# Patient Record
Sex: MALE | Race: WHITE | URBAN - METROPOLITAN AREA
[De-identification: names, ages, dates, MRNs, and addresses within clinical notes are randomized per-mention and may not be internally consistent; named-entity substitution may affect disease eponyms.]

---
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
## 2019-08-06 ENCOUNTER — OFFICE VISIT (OUTPATIENT)
Dept: FAMILY MEDICINE CLINIC | Facility: CLINIC | Age: 19
End: 2019-08-06
Payer: COMMERCIAL

## 2019-08-06 VITALS
RESPIRATION RATE: 18 BRPM | BODY MASS INDEX: 23.02 KG/M2 | HEIGHT: 69.6 IN | WEIGHT: 159 LBS | HEART RATE: 58 BPM | SYSTOLIC BLOOD PRESSURE: 116 MMHG | TEMPERATURE: 98 F | DIASTOLIC BLOOD PRESSURE: 68 MMHG

## 2019-08-06 DIAGNOSIS — Z00.00 ENCOUNTER FOR HEALTH MAINTENANCE EXAMINATION IN ADULT: Primary | ICD-10-CM

## 2019-08-06 PROCEDURE — 99395 PREV VISIT EST AGE 18-39: CPT | Performed by: NURSE PRACTITIONER

## 2019-08-06 NOTE — PROGRESS NOTES
HPI:    Patient ID: Slade Singh is a 20year old male. HPI physical.  Dates will be studying abroad in [facility]. Patient states he has been doing well with his asthma–only uses the inhaler occasionally for exercise otherwise does not need it.   [place] Encounter for health maintenance examination in adult  (primary encounter diagnosis)    No orders of the defined types were placed in this encounter.       Meds This Visit:  Requested Prescriptions      No prescriptions requested or ordered in this encounte · Take medicine as prescribed to relieve your symptoms. Unless another medicine was prescribed, you can try over-the-counter motion sickness pills. Note that these medicines may cause drowsiness. · If symptoms are severe, rest quietly in bed.  Change posit

## 2019-08-06 NOTE — PATIENT INSTRUCTIONS
Recommend Hepatitis A -  Series of 2  Given 6 months apart    Gardasil 9 (HPV) vaccine-  series of 3 given 1, then in 2 months, then in 6 months.      Discussed that labyrinthitis is an infection of the inner ear- typically it is viral and just takes time t have no symptoms, you are at a higher risk of falling. Let someone help you when you get up. Get rid of home hazards such as loose electrical cords and throw rugs. Don’t walk in unfamiliar areas that are not lighted.  Use night lights in bathrooms and [facility]

## 2021-05-24 ENCOUNTER — OFFICE VISIT (OUTPATIENT)
Dept: FAMILY MEDICINE CLINIC | Facility: CLINIC | Age: 21
End: 2021-05-24
Payer: COMMERCIAL

## 2021-05-24 ENCOUNTER — TELEPHONE (OUTPATIENT)
Dept: FAMILY MEDICINE CLINIC | Facility: CLINIC | Age: 21
End: 2021-05-24

## 2021-05-24 VITALS
HEIGHT: 69.5 IN | WEIGHT: 160 LBS | BODY MASS INDEX: 23.16 KG/M2 | RESPIRATION RATE: 16 BRPM | DIASTOLIC BLOOD PRESSURE: 64 MMHG | SYSTOLIC BLOOD PRESSURE: 102 MMHG | TEMPERATURE: 99 F | HEART RATE: 88 BPM | OXYGEN SATURATION: 98 %

## 2021-05-24 DIAGNOSIS — L30.9 DERMATITIS: Primary | ICD-10-CM

## 2021-05-24 PROCEDURE — 3078F DIAST BP <80 MM HG: CPT | Performed by: NURSE PRACTITIONER

## 2021-05-24 PROCEDURE — 99213 OFFICE O/P EST LOW 20 MIN: CPT | Performed by: NURSE PRACTITIONER

## 2021-05-24 PROCEDURE — 3008F BODY MASS INDEX DOCD: CPT | Performed by: NURSE PRACTITIONER

## 2021-05-24 PROCEDURE — 3074F SYST BP LT 130 MM HG: CPT | Performed by: NURSE PRACTITIONER

## 2021-05-24 RX ORDER — PREDNISONE 10 MG/1
TABLET ORAL
Qty: 18 TABLET | Refills: 0 | Status: SHIPPED | OUTPATIENT
Start: 2021-05-24 | End: 2021-05-27

## 2021-05-24 NOTE — PATIENT INSTRUCTIONS
Tapering dose of prednisone. Take prednisone with food and not at bedtime. Return to clinic if rash not improving or if worsens.

## 2021-05-24 NOTE — TELEPHONE ENCOUNTER
Has appt tomorrow. Answered yes to negative covid test on travel screening. Pt has to get tested weekly for school. Denies any other covid symptoms.  Pt does have a rash on chest/ believes it could be from tick bite

## 2021-05-24 NOTE — PROGRESS NOTES
HPI:    Patient ID: Jeromy Silva is a 24year old male. HPI     Rash to upper chest for at least a week. Has been spreading. No itching.    Recently graduated from [employer] and is back home for a few weeks before he moves out to the [facility] a Musculoskeletal:         General: Normal range of motion. Skin:     General: Skin is warm and dry. Findings: Rash (Scattered erythematous rash especially across the trunk anteriorly and the upper arms.   There is some areas to the back especially o

## 2021-05-26 ENCOUNTER — TELEPHONE (OUTPATIENT)
Dept: FAMILY MEDICINE CLINIC | Facility: CLINIC | Age: 21
End: 2021-05-26

## 2021-05-26 NOTE — TELEPHONE ENCOUNTER
Spoke with Dad. He states that they thought that they had tested him for covid in ER. Rash is on chest, under arms, back, lips and mouth. Dad states that it is not on his face, hands or feet.      Dad will confirm if rash has spread since he saw him this

## 2021-05-26 NOTE — TELEPHONE ENCOUNTER
Dad confirmed that rapid covid in ER was negative. Rash has not spread anywhere new than what was stated before.

## 2021-05-26 NOTE — TELEPHONE ENCOUNTER
Patient's father Tyra Mendiola informed of the below recommendations. F/u appt scheduled.      Future Appointments   Date Time Provider Omar Ashli   5/27/2021 11:00 AM LINDSEY Grey [facility] [place]

## 2021-05-26 NOTE — TELEPHONE ENCOUNTER
Please let dad know that I looked in the care everywhere ER record and his Lyme's disease test came back negative. The rest of the lab work does show some inflammation. –Has he had a COVID-19 test?  I have not seen results for that anywhere.   Also please a

## 2021-05-26 NOTE — TELEPHONE ENCOUNTER
Would recommend continuing doxycycline for now–be cautious with the sun as it will increased risk for sunburn.   He can take Tylenol or ibuprofen for fever and mouth pain–he can try Biotene mouth rinse/Anbesol to help with sores–follow-up in the office margret

## 2021-05-26 NOTE — TELEPHONE ENCOUNTER
requests appt tomorrow for rash / mouth sores      went to ER Sunday night - poss Lymes dx    ( awaiting results )    wants to be re-evaluated       No future appointments.

## 2021-05-26 NOTE — TELEPHONE ENCOUNTER
Dad called with update in regards to Pierre's condition. He states that rash was worsening after appointment with [facility] on Monday. Dad states that specifically rash was worsening in his mouth. They took Aury Montiel to the [facility] ER on Monday night.      Awaiting sandy

## 2021-05-27 ENCOUNTER — LAB ENCOUNTER (OUTPATIENT)
Dept: LAB | Age: 21
End: 2021-05-27
Attending: NURSE PRACTITIONER
Payer: COMMERCIAL

## 2021-05-27 ENCOUNTER — OFFICE VISIT (OUTPATIENT)
Dept: FAMILY MEDICINE CLINIC | Facility: CLINIC | Age: 21
End: 2021-05-27
Payer: COMMERCIAL

## 2021-05-27 VITALS
HEIGHT: 69.5 IN | TEMPERATURE: 99 F | BODY MASS INDEX: 22.9 KG/M2 | DIASTOLIC BLOOD PRESSURE: 64 MMHG | HEART RATE: 102 BPM | RESPIRATION RATE: 16 BRPM | WEIGHT: 158.19 LBS | OXYGEN SATURATION: 97 % | SYSTOLIC BLOOD PRESSURE: 108 MMHG

## 2021-05-27 DIAGNOSIS — J02.9 PHARYNGITIS, UNSPECIFIED ETIOLOGY: Primary | ICD-10-CM

## 2021-05-27 DIAGNOSIS — L42 PITYRIASIS ROSEA: ICD-10-CM

## 2021-05-27 DIAGNOSIS — K12.0 CANKER SORES ORAL: ICD-10-CM

## 2021-05-27 PROCEDURE — 85025 COMPLETE CBC W/AUTO DIFF WBC: CPT | Performed by: NURSE PRACTITIONER

## 2021-05-27 PROCEDURE — 99214 OFFICE O/P EST MOD 30 MIN: CPT | Performed by: NURSE PRACTITIONER

## 2021-05-27 PROCEDURE — 86664 EPSTEIN-BARR NUCLEAR ANTIGEN: CPT | Performed by: NURSE PRACTITIONER

## 2021-05-27 PROCEDURE — 3078F DIAST BP <80 MM HG: CPT | Performed by: NURSE PRACTITIONER

## 2021-05-27 PROCEDURE — 87081 CULTURE SCREEN ONLY: CPT | Performed by: NURSE PRACTITIONER

## 2021-05-27 PROCEDURE — 86644 CMV ANTIBODY: CPT | Performed by: NURSE PRACTITIONER

## 2021-05-27 PROCEDURE — 86645 CMV ANTIBODY IGM: CPT | Performed by: NURSE PRACTITIONER

## 2021-05-27 PROCEDURE — 86665 EPSTEIN-BARR CAPSID VCA: CPT | Performed by: NURSE PRACTITIONER

## 2021-05-27 PROCEDURE — 80053 COMPREHEN METABOLIC PANEL: CPT | Performed by: NURSE PRACTITIONER

## 2021-05-27 PROCEDURE — 3074F SYST BP LT 130 MM HG: CPT | Performed by: NURSE PRACTITIONER

## 2021-05-27 PROCEDURE — 3008F BODY MASS INDEX DOCD: CPT | Performed by: NURSE PRACTITIONER

## 2021-05-27 PROCEDURE — 87880 STREP A ASSAY W/OPTIC: CPT | Performed by: NURSE PRACTITIONER

## 2021-05-27 RX ORDER — PREDNISONE 20 MG/1
20 TABLET ORAL DAILY
Qty: 21 TABLET | Refills: 0 | Status: SHIPPED | OUTPATIENT
Start: 2021-05-27 | End: 2021-05-27

## 2021-05-27 RX ORDER — PREDNISONE 20 MG/1
20 TABLET ORAL 2 TIMES DAILY
Qty: 10 TABLET | Refills: 0 | Status: SHIPPED | OUTPATIENT
Start: 2021-05-27 | End: 2021-06-01

## 2021-05-27 RX ORDER — DOXYCYCLINE HYCLATE 100 MG/1
100 CAPSULE ORAL 2 TIMES DAILY
COMMUNITY
Start: 2021-05-25 | End: 2021-11-24 | Stop reason: ALTCHOICE

## 2021-05-27 NOTE — PROGRESS NOTES
CHIEF COMPLAINT:   Patient presents with:  ER F/U: Mouth sores, sore throat, and fever      HPI:   Oswald Ferrell is a 24year old male who presents to clinic today with complaints of rash to torso since last Tue/Wed - - the Friday drove back from [address] [place] [address] maxillary sinuses  EYES: conjunctiva clear, no discharge  EARS:. Tympanic membranes are clear bilaterally  NOSE: nostrils patent, clear  LYMPH: Positive, tender, enlarged, bilateral lymphadenopathy.     THROAT: oral mucosa with multiple canker sore lesions pregnant, make sure to tell your healthcare provider about your rash. Symptoms of pityriasis rosea  In some people, the rash shows up 1 to 2 weeks after symptoms such as headache, sore throat, nausea, stuffy nose, and fever.  The rash often starts with on provider right away if you have any of these:  · New symptoms  · Rash that lasts for more than 3 months  · Symptoms that don’t get better in 1 to 2 months, or get worse  Sj last reviewed this educational content on 6/1/2019  © 9414-4445 [employer]

## 2021-05-27 NOTE — PATIENT INSTRUCTIONS
Rest, contact sports for 6 weeks. Rest, increase fluids, salt water gargles , biotene, ambisol ,Tylenol/Ibuprofen, follow up if symptoms persist or increase. Start prednisone - with food     Go to ER if any injury to abdomen - with pain.    Mayte Kaiser pink, watery lotion that can help stop itching. · Antihistamine. This medicine can help reduce itching. You can put it on the skin as a cream or take it by mouth as a pill.   · Other anti-itch lotion or cream. Ask your healthcare provider about other anti-

## 2021-05-28 ENCOUNTER — TELEPHONE (OUTPATIENT)
Dept: FAMILY MEDICINE CLINIC | Facility: CLINIC | Age: 21
End: 2021-05-28

## 2021-05-28 ENCOUNTER — PATIENT MESSAGE (OUTPATIENT)
Dept: FAMILY MEDICINE CLINIC | Facility: CLINIC | Age: 21
End: 2021-05-28

## 2021-05-28 NOTE — TELEPHONE ENCOUNTER
----- Message from LINDSEY Casas sent at 5/28/2021  8:46 AM CDT -----  Please make sure patient receives my chart message as below-  Your blood work shows that your potassium is back to normal-your sodium is just a little low would recommend some G

## 2021-05-28 NOTE — TELEPHONE ENCOUNTER
----- Message from LINDSEY Nelson sent at 5/28/2021  3:08 PM CDT -----  Please call patient to see how he is feeling today-also make sure that he receives my chart message as below.   Your Vitaly-Barr virus came back negative-the cytomegalovirus (CM

## 2021-05-29 NOTE — TELEPHONE ENCOUNTER
From: Jamshid Bolanos  To: LINDSEY Boston  Sent: 5/28/2021 5:28 PM CDT  Subject: Non-Urgent Medical Question    Hi, this is cristin, curious as to if we reran the mono next week if we could rerun Lyme antibody too again just since that can take some time t

## 2021-06-01 ENCOUNTER — TELEPHONE (OUTPATIENT)
Dept: FAMILY MEDICINE CLINIC | Facility: CLINIC | Age: 21
End: 2021-06-01

## 2021-06-01 NOTE — TELEPHONE ENCOUNTER
----- Message from LINDSEY Nicole sent at 5/29/2021  6:22 PM CDT -----  Please make sure patient receives my chart message as below  Your cytomegalovirus came back negative for previous infection (IgG) and negative for current infection (IgM)-again,

## 2021-06-01 NOTE — TELEPHONE ENCOUNTER
Patient has viewed [facility] [employer] Message from [place] [employer].   Ashvin Marcus, 06/01/21, 5:12 PM

## 2021-06-02 ENCOUNTER — PATIENT MESSAGE (OUTPATIENT)
Dept: FAMILY MEDICINE CLINIC | Facility: CLINIC | Age: 21
End: 2021-06-02

## 2021-06-03 ENCOUNTER — LAB ENCOUNTER (OUTPATIENT)
Dept: LAB | Age: 21
End: 2021-06-03
Attending: NURSE PRACTITIONER
Payer: COMMERCIAL

## 2021-06-03 ENCOUNTER — OFFICE VISIT (OUTPATIENT)
Dept: FAMILY MEDICINE CLINIC | Facility: CLINIC | Age: 21
End: 2021-06-03
Payer: COMMERCIAL

## 2021-06-03 VITALS
TEMPERATURE: 98 F | BODY MASS INDEX: 22.44 KG/M2 | WEIGHT: 155 LBS | SYSTOLIC BLOOD PRESSURE: 98 MMHG | DIASTOLIC BLOOD PRESSURE: 70 MMHG | HEIGHT: 69.5 IN | RESPIRATION RATE: 14 BRPM | OXYGEN SATURATION: 97 % | HEART RATE: 61 BPM

## 2021-06-03 DIAGNOSIS — L42 PITYRIASIS ROSEA: ICD-10-CM

## 2021-06-03 DIAGNOSIS — L30.9 DERMATITIS: ICD-10-CM

## 2021-06-03 DIAGNOSIS — R53.83 FATIGUE, UNSPECIFIED TYPE: ICD-10-CM

## 2021-06-03 DIAGNOSIS — B34.9 VIRAL ILLNESS: Primary | ICD-10-CM

## 2021-06-03 DIAGNOSIS — K12.0 CANKER SORES ORAL: ICD-10-CM

## 2021-06-03 DIAGNOSIS — J02.9 PHARYNGITIS, UNSPECIFIED ETIOLOGY: ICD-10-CM

## 2021-06-03 DIAGNOSIS — R79.9 ABNORMAL BLOOD CHEMISTRY: ICD-10-CM

## 2021-06-03 PROCEDURE — 3078F DIAST BP <80 MM HG: CPT | Performed by: NURSE PRACTITIONER

## 2021-06-03 PROCEDURE — 85025 COMPLETE CBC W/AUTO DIFF WBC: CPT | Performed by: NURSE PRACTITIONER

## 2021-06-03 PROCEDURE — 83690 ASSAY OF LIPASE: CPT | Performed by: NURSE PRACTITIONER

## 2021-06-03 PROCEDURE — 86665 EPSTEIN-BARR CAPSID VCA: CPT | Performed by: NURSE PRACTITIONER

## 2021-06-03 PROCEDURE — 82150 ASSAY OF AMYLASE: CPT | Performed by: NURSE PRACTITIONER

## 2021-06-03 PROCEDURE — 87476 LYME DIS DNA AMP PROBE: CPT | Performed by: NURSE PRACTITIONER

## 2021-06-03 PROCEDURE — 86645 CMV ANTIBODY IGM: CPT | Performed by: NURSE PRACTITIONER

## 2021-06-03 PROCEDURE — 3008F BODY MASS INDEX DOCD: CPT | Performed by: NURSE PRACTITIONER

## 2021-06-03 PROCEDURE — 86644 CMV ANTIBODY: CPT | Performed by: NURSE PRACTITIONER

## 2021-06-03 PROCEDURE — 80053 COMPREHEN METABOLIC PANEL: CPT | Performed by: NURSE PRACTITIONER

## 2021-06-03 PROCEDURE — 3074F SYST BP LT 130 MM HG: CPT | Performed by: NURSE PRACTITIONER

## 2021-06-03 PROCEDURE — 99214 OFFICE O/P EST MOD 30 MIN: CPT | Performed by: NURSE PRACTITIONER

## 2021-06-03 PROCEDURE — 86664 EPSTEIN-BARR NUCLEAR ANTIGEN: CPT | Performed by: NURSE PRACTITIONER

## 2021-06-03 RX ORDER — ACYCLOVIR 400 MG/1
400 TABLET ORAL 4 TIMES DAILY
COMMUNITY
Start: 2021-05-28

## 2021-06-03 RX ORDER — ONDANSETRON 4 MG/1
4 TABLET, ORALLY DISINTEGRATING ORAL EVERY 8 HOURS PRN
COMMUNITY
Start: 2021-05-28

## 2021-06-03 NOTE — TELEPHONE ENCOUNTER
From: Champ Parish  To: LINDSEY Toro  Sent: 6/2/2021 9:56 PM CDT  Subject: Visit Follow-up Question    Hi, I plan to still bring J back in to rerun the mono just so we know for sure what this might have been.      He ended up being in the ER Friday

## 2021-06-03 NOTE — PROGRESS NOTES
CHIEF COMPLAINT:   Patient presents with:  ER F/U      HPI:   Mariia Billingsley is a 24year old male who presents to clinic today with complaints of Friday (5/28) felt headache, tired slept all day - woke up for 10 min the 4 hrs - then up for maybe 2 hrs   S denies complaints   GI: No N/V/C/D.   NEURO: denies complaints    EXAM:   BP 98/70   Pulse 61   Temp 97.6 °F (36.4 °C)   Resp 14   Ht 5' 9.5\" (1.765 m)   Wt 155 lb (70.3 kg)   SpO2 97%   BMI 22.56 kg/m²   GENERAL: well developed, well nourished,in no appar prescriptions requested or ordered in this encounter          JACQUELINE Fontana-BC  6/3/2021   9:42 AM

## 2021-06-03 NOTE — PATIENT INSTRUCTIONS
Rest- no contact sports for 6 weeks -   Blood work pending     Follow up if symptoms persist or increase

## 2021-06-03 NOTE — TELEPHONE ENCOUNTER
Please see [employer] message.      Future Appointments   Date Time Provider Omar Cornelius   6/3/2021  4:30 PM LINDSEY Howard [facility] [place]

## 2021-06-04 ENCOUNTER — TELEPHONE (OUTPATIENT)
Dept: FAMILY MEDICINE CLINIC | Facility: CLINIC | Age: 21
End: 2021-06-04

## 2021-06-04 NOTE — TELEPHONE ENCOUNTER
----- Message from LINDSEY Mensah sent at 6/4/2021 11:36 AM CDT -----  Please make sure patient receives my chart message as below  Your blood work is looking better-potassium and sodium are back to normal, CBC is back to normal-normal neutrophils-n

## 2021-06-07 ENCOUNTER — TELEPHONE (OUTPATIENT)
Dept: FAMILY MEDICINE CLINIC | Facility: CLINIC | Age: 21
End: 2021-06-07

## 2021-06-07 NOTE — TELEPHONE ENCOUNTER
----- Message from LINDSEY Mensah sent at 6/7/2021  8:57 AM CDT -----  Please make sure patient receives my chart message as below  Cytomegalovirus (CMV) is negative-Lyme's is still [id number]-[id number]-so if symptoms persist or increase. Thank you,Judy

## 2021-06-09 ENCOUNTER — TELEPHONE (OUTPATIENT)
Dept: FAMILY MEDICINE CLINIC | Facility: CLINIC | Age: 21
End: 2021-06-09

## 2021-06-09 NOTE — TELEPHONE ENCOUNTER
----- Message from LINDSEY Jimenez sent at 6/9/2021  9:28 AM CDT -----  Please make sure patient receives my chart message as below-  Your Lyme's test came back negative-finish doxycycline follow-up if symptoms persist or increase. Thank you,Judy

## 2021-07-02 ENCOUNTER — PATIENT MESSAGE (OUTPATIENT)
Dept: FAMILY MEDICINE CLINIC | Facility: CLINIC | Age: 21
End: 2021-07-02

## 2021-07-02 NOTE — TELEPHONE ENCOUNTER
From: Guilherme Cruz  To: LINDSEY Peterson  Sent: 7/2/2021 10:49 AM CDT  Subject: Non-Urgent Medical Question    Hi, we are up on vacation and forgot Aleksey's albuterol inhaler.  Can we get a refill prescription sent to [facility] and then we can fill it u

## 2021-11-24 ENCOUNTER — OFFICE VISIT (OUTPATIENT)
Dept: FAMILY MEDICINE CLINIC | Facility: CLINIC | Age: 21
End: 2021-11-24
Payer: COMMERCIAL

## 2021-11-24 VITALS
OXYGEN SATURATION: 99 % | DIASTOLIC BLOOD PRESSURE: 72 MMHG | SYSTOLIC BLOOD PRESSURE: 108 MMHG | HEART RATE: 73 BPM | TEMPERATURE: 100 F

## 2021-11-24 DIAGNOSIS — J40 BRONCHITIS: ICD-10-CM

## 2021-11-24 DIAGNOSIS — R50.9 FEVER, UNSPECIFIED FEVER CAUSE: Primary | ICD-10-CM

## 2021-11-24 PROCEDURE — 3078F DIAST BP <80 MM HG: CPT | Performed by: NURSE PRACTITIONER

## 2021-11-24 PROCEDURE — 99214 OFFICE O/P EST MOD 30 MIN: CPT | Performed by: NURSE PRACTITIONER

## 2021-11-24 PROCEDURE — 3074F SYST BP LT 130 MM HG: CPT | Performed by: NURSE PRACTITIONER

## 2021-11-24 RX ORDER — AZITHROMYCIN 250 MG/1
250 TABLET, FILM COATED ORAL DAILY
COMMUNITY

## 2021-11-24 RX ORDER — DOXYCYCLINE HYCLATE 100 MG/1
100 CAPSULE ORAL 2 TIMES DAILY
Qty: 20 CAPSULE | Refills: 0 | Status: SHIPPED | OUTPATIENT
Start: 2021-11-24 | End: 2021-12-04

## 2021-11-24 NOTE — PATIENT INSTRUCTIONS
Start doxycycline today    Covid test pending - quarantine until results. If negative, consider CXR on Monday. Monitor O2 level at home - go to ER if below 90%    Otherwise follow up as needed.

## 2021-11-24 NOTE — PROGRESS NOTES
HPI:    Patient ID: Makenna Butler is a 24year old male. HPI     Respiratory Clinic    Patient with lingering cold that started about 2.5 weeks ago (11/5). Started as nasal congestion. Was visiting friends in [place]. More sick on the way here.   Had C to calculate BMI. Physical Exam  Vitals and nursing note reviewed. Constitutional:       General: He is not in acute distress. Appearance: Normal appearance. He is well-developed. HENT:      Head: Normocephalic and atraumatic.       Right Ear: Imaging & Referrals:  None      Patient Instructions   Start doxycycline today    Covid test pending - quarantine until results. If negative, consider CXR on Monday.      Monitor O2 level at home - go to ER if below 90%    Otherwise follow up as n

## 2021-11-26 ENCOUNTER — TELEPHONE (OUTPATIENT)
Dept: FAMILY MEDICINE CLINIC | Facility: CLINIC | Age: 21
End: 2021-11-26

## 2021-11-26 NOTE — TELEPHONE ENCOUNTER
----- Message from LINDSEY Gutierrez sent at 11/26/2021  8:12 AM CST -----  Please notify the patient his covid test is negative. Continue treatment as outlined by Gerald Arnold at visit.

## 2021-12-26 ENCOUNTER — PATIENT MESSAGE (OUTPATIENT)
Dept: FAMILY MEDICINE CLINIC | Facility: CLINIC | Age: 21
End: 2021-12-26

## 2021-12-27 ENCOUNTER — PATIENT MESSAGE (OUTPATIENT)
Dept: FAMILY MEDICINE CLINIC | Facility: CLINIC | Age: 21
End: 2021-12-27

## 2021-12-27 NOTE — TELEPHONE ENCOUNTER
From: Willie Oates  To: LINDSEY Santana  Sent: 12/26/2021 4:59 PM CST  Subject: Covid     Hi, I just wanted to let u know Christine Pi had a positive Covid test today. We did two at home. The line at the place was too long so we just did two at home ones.  Ky Sky

## 2021-12-28 NOTE — TELEPHONE ENCOUNTER
From: Nataliia Fontenot  Sent: 12/27/2021 7:49 PM CST  To: [facility] Clinical Staff  Subject: Covid     Thanks Ralph Bradshaw!! Fingers crossed his initial two vaccines keep it mild! I hope things quiet down at work for you soon!

## 2022-03-02 ENCOUNTER — PATIENT MESSAGE (OUTPATIENT)
Dept: FAMILY MEDICINE CLINIC | Facility: CLINIC | Age: 22
End: 2022-03-02

## 2022-03-03 ENCOUNTER — PATIENT MESSAGE (OUTPATIENT)
Dept: FAMILY MEDICINE CLINIC | Facility: CLINIC | Age: 22
End: 2022-03-03

## 2022-03-03 NOTE — TELEPHONE ENCOUNTER
From: Mario Melendez  Sent: 3/3/2022 12:19 PM CST  To: [facility] Clinical Staff  Subject: Covid Recovery Certificate    I had first tested positive on 12/26 with a RAT test

## 2022-03-03 NOTE — TELEPHONE ENCOUNTER
From: Virgie Canchola  To: Shima Press, APRN  Sent: 3/2/2022 8:39 PM CST  Subject: Covid Recovery Certificate    Shayne Mathew,     Would it be possible to get a covid recovery certificate for me? I'm traveling to [facility] next week and realized that they recently changed their travel requirements- needing either a booster shot or confirmation of covid recovery. I have yet to receive my booster but was hoping since I had covid recently that I could get a certificate and go that route instead given the short notice.      Thanks,  Teresa Culp

## (undated) NOTE — MR AVS SNAPSHOT
[address]  [address] [id number]  [phone]               Thank you for choosing us for your health care visit with JACQUELINE Perez.   We are glad to serve you and happy to provide you with this summary o [address] [facility] [address], [phone], [address] [facility] [address]    Hours:  24-hours Phone:  [phone]    - Albuterol Sulfate  (90 Base) MCG/ACT Aers  - azithromycin 500 MG Tabs            MyChart     S

## (undated) NOTE — LETTER
03/03/22    To whom it may concern,     Tarik Natarajan is a patient at this office. He tested positive for COVID-19 on December 26, 2021 with a rapid COVID-19 test.  He has since recovered from Covid and should have natural immunity.       Thank you,     LINDSEY Christopher, [facility]

## (undated) NOTE — MR AVS SNAPSHOT
[address]  [address] [address] [id number]  [phone]               Thank you for choosing us for your health care visit with JACQUELINE Fam.   We are glad to serve you and happy to provide you with this summary o o 2 or less hours of screen time a day  o 1 or more hours of physical activity a day    To help children live healthy active lives, parents can:  o Be role models themselves by making healthy eating and daily physical activity the norm for their family.   o These medications were sent to [facility], [address] [place] [address] 23, [phone], [address] [place] [id number]    Hours:  24-hours Phone:  [phone]    - Albuterol Sulfate A 108